# Patient Record
Sex: FEMALE | Race: WHITE | NOT HISPANIC OR LATINO
[De-identification: names, ages, dates, MRNs, and addresses within clinical notes are randomized per-mention and may not be internally consistent; named-entity substitution may affect disease eponyms.]

---

## 2020-08-03 ENCOUNTER — RESULT REVIEW (OUTPATIENT)
Age: 35
End: 2020-08-03

## 2021-11-05 PROBLEM — Z00.00 ENCOUNTER FOR PREVENTIVE HEALTH EXAMINATION: Status: ACTIVE | Noted: 2021-11-05

## 2021-11-08 ENCOUNTER — APPOINTMENT (OUTPATIENT)
Dept: OBGYN | Facility: CLINIC | Age: 36
End: 2021-11-08
Payer: COMMERCIAL

## 2021-11-08 ENCOUNTER — APPOINTMENT (OUTPATIENT)
Dept: ANTEPARTUM | Facility: CLINIC | Age: 36
End: 2021-11-08
Payer: COMMERCIAL

## 2021-11-08 DIAGNOSIS — Z83.3 FAMILY HISTORY OF DIABETES MELLITUS: ICD-10-CM

## 2021-11-08 DIAGNOSIS — Z82.49 FAMILY HISTORY OF ISCHEMIC HEART DISEASE AND OTHER DISEASES OF THE CIRCULATORY SYSTEM: ICD-10-CM

## 2021-11-08 DIAGNOSIS — Z3A.13 13 WEEKS GESTATION OF PREGNANCY: ICD-10-CM

## 2021-11-08 DIAGNOSIS — Z36.82 ENCOUNTER FOR ANTENATAL SCREENING FOR NUCHAL TRANSLUCENCY: ICD-10-CM

## 2021-11-08 PROCEDURE — 76801 OB US < 14 WKS SINGLE FETUS: CPT

## 2021-11-08 PROCEDURE — 99242 OFF/OP CONSLTJ NEW/EST SF 20: CPT

## 2021-11-08 PROCEDURE — 99202 OFFICE O/P NEW SF 15 MIN: CPT

## 2021-11-08 PROCEDURE — 0502F SUBSEQUENT PRENATAL CARE: CPT

## 2021-11-08 PROCEDURE — 76813 OB US NUCHAL MEAS 1 GEST: CPT

## 2021-11-08 RX ORDER — ASPIRIN 81 MG
81 TABLET, DELAYED RELEASE (ENTERIC COATED) ORAL
Refills: 0 | Status: ACTIVE | COMMUNITY

## 2021-11-08 NOTE — ACTIVE PROBLEMS
[Diabetes Mellitus] : no diabetes mellitus [Hypertension] : no hypertension [Heart Disease] : no heart disease [Autoimmune Disease] : no autoimmune disease [Renal Disease] : no kidney disease, no UTI [Psychiatric Disorders] : no psychiatric disorders [Neurologic Disorder] : no neurologic disorder, no epilepsy [Depression] : no depression, no post partum depression [Hepatic Disorder] : no hepatitis, no liver disease [Thrombophlebitis] : no varicosities, no phlebitis [Thyroid Disorder] : no thyroid dysfunction [Trauma] : no trauma/violence [Blood Transfusion (___ Ml)] : no history of blood transfusion

## 2022-01-04 LAB
1ST TRIMESTER DATA: NORMAL
ADDENDUM DOC: NORMAL
AFP PNL SERPL: NORMAL
AFP SERPL-ACNC: NORMAL
CLINICAL BIOCHEMIST REVIEW: NORMAL
FREE BETA HCG 1ST TRIMESTER: NORMAL
Lab: NORMAL
NOTES NTD: NORMAL
NT: NORMAL
PAPP-A SERPL-ACNC: NORMAL
TRISOMY 18/3: NORMAL

## 2022-01-06 ENCOUNTER — APPOINTMENT (OUTPATIENT)
Dept: ANTEPARTUM | Facility: CLINIC | Age: 37
End: 2022-01-06
Payer: COMMERCIAL

## 2022-01-06 VITALS — WEIGHT: 158 LBS | SYSTOLIC BLOOD PRESSURE: 124 MMHG | DIASTOLIC BLOOD PRESSURE: 76 MMHG

## 2022-01-06 PROCEDURE — 76811 OB US DETAILED SNGL FETUS: CPT

## 2022-02-08 NOTE — OB HISTORY
[Pregnancy History] : patient received anesthesia [[facility]] : [facility] [LMP: ___] : LMP: [unfilled] [FELIX: ___] : FLEIX: [unfilled] [EGA: ___ wks] : EGA: [unfilled] wks [Spontaneous] : Spontaneous conception [___] : no pregnancy complications reported

## 2022-02-08 NOTE — DISCUSSION/SUMMARY
[FreeTextEntry1] : The significance of nuchal translucency testing was explained to the patient and ultrasound was performed. The sensitivity of the test can be improved by combining with second trimester quad screening. This type of sequential testing minimally increases the false positive rate, but the detection rate for Down syndrome is increased. If the sequential testing is desired, a second stage quad should be drawn and sent. As an alternative, this can be done in our office. If the patient does not desire sequential testing, then a single marker for AFP may be sent to any lab after 15 completed weeks gestation.\par \par Prenatal diagnostic testing is clinically indicated for this patient. The limitations of NIPT testing were discussed with the patient and amniocentesis was noted to remain the gold standard for prenatal diagnostic testing. The significance of NIPT testing was reviewed and offered to the patient and she has agreed to testing. Blood was drawn and the results will be sent to your office in approximately 2 weeks. Sequential screening is recommended between 15-16 weeks. Anatomy scan is recommended at 19-20 weeks.\par \par =============================================\par I, Felecia Brooke, acted solely as a scribe for Dr. Abhi Ta on Nov 8 2021  6:00PM. All medical entries made by the Scribe were at my, Dr. Abhi Ta's, direction and personally dictated by me on Nov 8 2021  6:00PM . I have reviewed the chart and agree that the record accurately reflects my personal performance of the history, physical exam, assessment, and plan. I have also personally directed, reviewed, and agreed with the chart.\par =============================================

## 2022-02-08 NOTE — HISTORY OF PRESENT ILLNESS
[FreeTextEntry1] : The patient was seen in my office today for nuchal translucency testing. The limitations of testing were discussed with the patient and she was informed that this is a screening test. If she desires a diagnostic test like CVS or Amniocentesis, this may be performed.\par \par Patient is a 36 year old P1 presenting at 13w6d for  counseling because of advanced maternal age. FELIX 2022. Her age related risk for chromosomal abnormalities is 1:80. The patient reports an unremarkable maternal and paternal family history and her pregnancy history is also noted to be unremarkable.

## 2022-03-21 ENCOUNTER — APPOINTMENT (OUTPATIENT)
Dept: ANTEPARTUM | Facility: CLINIC | Age: 37
End: 2022-03-21
Payer: COMMERCIAL

## 2022-03-21 ENCOUNTER — APPOINTMENT (OUTPATIENT)
Dept: OBGYN | Facility: CLINIC | Age: 37
End: 2022-03-21
Payer: COMMERCIAL

## 2022-03-21 VITALS — SYSTOLIC BLOOD PRESSURE: 124 MMHG | WEIGHT: 169 LBS | DIASTOLIC BLOOD PRESSURE: 77 MMHG

## 2022-03-21 PROCEDURE — 76819 FETAL BIOPHYS PROFIL W/O NST: CPT

## 2022-03-21 PROCEDURE — 76816 OB US FOLLOW-UP PER FETUS: CPT

## 2022-03-21 PROCEDURE — 0502F SUBSEQUENT PRENATAL CARE: CPT

## 2022-04-28 ENCOUNTER — APPOINTMENT (OUTPATIENT)
Dept: ANTEPARTUM | Facility: CLINIC | Age: 37
End: 2022-04-28
Payer: COMMERCIAL

## 2022-04-28 ENCOUNTER — APPOINTMENT (OUTPATIENT)
Dept: OBGYN | Facility: CLINIC | Age: 37
End: 2022-04-28
Payer: COMMERCIAL

## 2022-04-28 VITALS — WEIGHT: 170 LBS | SYSTOLIC BLOOD PRESSURE: 111 MMHG | DIASTOLIC BLOOD PRESSURE: 77 MMHG

## 2022-04-28 PROCEDURE — 76819 FETAL BIOPHYS PROFIL W/O NST: CPT

## 2022-04-28 PROCEDURE — 76816 OB US FOLLOW-UP PER FETUS: CPT

## 2022-04-28 PROCEDURE — 0502F SUBSEQUENT PRENATAL CARE: CPT

## 2022-05-04 ENCOUNTER — APPOINTMENT (OUTPATIENT)
Dept: ANTEPARTUM | Facility: CLINIC | Age: 37
End: 2022-05-04
Payer: COMMERCIAL

## 2022-05-04 ENCOUNTER — APPOINTMENT (OUTPATIENT)
Dept: OBGYN | Facility: CLINIC | Age: 37
End: 2022-05-04

## 2022-05-04 VITALS — WEIGHT: 171 LBS | SYSTOLIC BLOOD PRESSURE: 128 MMHG | DIASTOLIC BLOOD PRESSURE: 76 MMHG

## 2022-05-04 PROCEDURE — 76819 FETAL BIOPHYS PROFIL W/O NST: CPT

## 2022-05-09 ENCOUNTER — INPATIENT (INPATIENT)
Facility: HOSPITAL | Age: 37
LOS: 1 days | Discharge: HOME | End: 2022-05-11
Attending: SPECIALIST | Admitting: SPECIALIST

## 2022-05-09 ENCOUNTER — TRANSCRIPTION ENCOUNTER (OUTPATIENT)
Age: 37
End: 2022-05-09

## 2022-05-09 VITALS
RESPIRATION RATE: 14 BRPM | WEIGHT: 171.96 LBS | HEIGHT: 66 IN | DIASTOLIC BLOOD PRESSURE: 67 MMHG | TEMPERATURE: 98 F | HEART RATE: 88 BPM | SYSTOLIC BLOOD PRESSURE: 105 MMHG

## 2022-05-09 DIAGNOSIS — Z34.90 ENCOUNTER FOR SUPERVISION OF NORMAL PREGNANCY, UNSPECIFIED, UNSPECIFIED TRIMESTER: ICD-10-CM

## 2022-05-09 LAB
BASOPHILS # BLD AUTO: 0.01 K/UL — SIGNIFICANT CHANGE UP (ref 0–0.2)
BASOPHILS NFR BLD AUTO: 0.1 % — SIGNIFICANT CHANGE UP (ref 0–2)
BLD GP AB SCN SERPL QL: NEGATIVE — SIGNIFICANT CHANGE UP
COVID-19 SPIKE DOMAIN AB INTERP: POSITIVE
COVID-19 SPIKE DOMAIN ANTIBODY RESULT: 102 U/ML — HIGH
EOSINOPHIL # BLD AUTO: 0.07 K/UL — SIGNIFICANT CHANGE UP (ref 0–0.5)
EOSINOPHIL NFR BLD AUTO: 0.9 % — SIGNIFICANT CHANGE UP (ref 0–6)
HCT VFR BLD CALC: 35.6 % — SIGNIFICANT CHANGE UP (ref 34.5–45)
HGB BLD-MCNC: 11.6 G/DL — SIGNIFICANT CHANGE UP (ref 11.5–15.5)
IANC: 5.14 K/UL — SIGNIFICANT CHANGE UP (ref 1.8–7.4)
IMM GRANULOCYTES NFR BLD AUTO: 0.3 % — SIGNIFICANT CHANGE UP (ref 0–1.5)
LYMPHOCYTES # BLD AUTO: 1.62 K/UL — SIGNIFICANT CHANGE UP (ref 1–3.3)
LYMPHOCYTES # BLD AUTO: 21.7 % — SIGNIFICANT CHANGE UP (ref 13–44)
MCHC RBC-ENTMCNC: 28.9 PG — SIGNIFICANT CHANGE UP (ref 27–34)
MCHC RBC-ENTMCNC: 32.6 GM/DL — SIGNIFICANT CHANGE UP (ref 32–36)
MCV RBC AUTO: 88.8 FL — SIGNIFICANT CHANGE UP (ref 80–100)
MONOCYTES # BLD AUTO: 0.6 K/UL — SIGNIFICANT CHANGE UP (ref 0–0.9)
MONOCYTES NFR BLD AUTO: 8 % — SIGNIFICANT CHANGE UP (ref 2–14)
NEUTROPHILS # BLD AUTO: 5.14 K/UL — SIGNIFICANT CHANGE UP (ref 1.8–7.4)
NEUTROPHILS NFR BLD AUTO: 69 % — SIGNIFICANT CHANGE UP (ref 43–77)
NRBC # BLD: 0 /100 WBCS — SIGNIFICANT CHANGE UP
NRBC # FLD: 0 K/UL — SIGNIFICANT CHANGE UP
PLATELET # BLD AUTO: 187 K/UL — SIGNIFICANT CHANGE UP (ref 150–400)
RBC # BLD: 4.01 M/UL — SIGNIFICANT CHANGE UP (ref 3.8–5.2)
RBC # FLD: 12.8 % — SIGNIFICANT CHANGE UP (ref 10.3–14.5)
RH IG SCN BLD-IMP: POSITIVE — SIGNIFICANT CHANGE UP
SARS-COV-2 IGG+IGM SERPL QL IA: 102 U/ML — HIGH
SARS-COV-2 IGG+IGM SERPL QL IA: POSITIVE
T PALLIDUM AB TITR SER: NEGATIVE — SIGNIFICANT CHANGE UP
WBC # BLD: 7.46 K/UL — SIGNIFICANT CHANGE UP (ref 3.8–10.5)
WBC # FLD AUTO: 7.46 K/UL — SIGNIFICANT CHANGE UP (ref 3.8–10.5)

## 2022-05-09 RX ORDER — SODIUM CHLORIDE 9 MG/ML
1000 INJECTION, SOLUTION INTRAVENOUS
Refills: 0 | Status: DISCONTINUED | OUTPATIENT
Start: 2022-05-09 | End: 2022-05-10

## 2022-05-09 RX ORDER — AMPICILLIN TRIHYDRATE 250 MG
1 CAPSULE ORAL EVERY 4 HOURS
Refills: 0 | Status: DISCONTINUED | OUTPATIENT
Start: 2022-05-09 | End: 2022-05-10

## 2022-05-09 RX ORDER — OXYTOCIN 10 UNIT/ML
2 VIAL (ML) INJECTION
Qty: 30 | Refills: 0 | Status: DISCONTINUED | OUTPATIENT
Start: 2022-05-09 | End: 2022-05-10

## 2022-05-09 RX ORDER — AMPICILLIN TRIHYDRATE 250 MG
2 CAPSULE ORAL ONCE
Refills: 0 | Status: COMPLETED | OUTPATIENT
Start: 2022-05-09 | End: 2022-05-09

## 2022-05-09 RX ORDER — SODIUM CHLORIDE 9 MG/ML
1000 INJECTION INTRAMUSCULAR; INTRAVENOUS; SUBCUTANEOUS
Refills: 0 | Status: DISCONTINUED | OUTPATIENT
Start: 2022-05-09 | End: 2022-05-10

## 2022-05-09 RX ORDER — OXYTOCIN 10 UNIT/ML
333.33 VIAL (ML) INJECTION
Qty: 20 | Refills: 0 | Status: DISCONTINUED | OUTPATIENT
Start: 2022-05-09 | End: 2022-05-10

## 2022-05-09 RX ORDER — SODIUM CHLORIDE 9 MG/ML
300 INJECTION INTRAMUSCULAR; INTRAVENOUS; SUBCUTANEOUS ONCE
Refills: 0 | Status: COMPLETED | OUTPATIENT
Start: 2022-05-09 | End: 2022-05-09

## 2022-05-09 RX ORDER — CITRIC ACID/SODIUM CITRATE 300-500 MG
15 SOLUTION, ORAL ORAL EVERY 6 HOURS
Refills: 0 | Status: DISCONTINUED | OUTPATIENT
Start: 2022-05-09 | End: 2022-05-10

## 2022-05-09 RX ADMIN — SODIUM CHLORIDE 125 MILLILITER(S): 9 INJECTION INTRAMUSCULAR; INTRAVENOUS; SUBCUTANEOUS at 22:13

## 2022-05-09 RX ADMIN — Medication 108 GRAM(S): at 15:36

## 2022-05-09 RX ADMIN — Medication 108 GRAM(S): at 10:55

## 2022-05-09 RX ADMIN — Medication 216 GRAM(S): at 06:43

## 2022-05-09 RX ADMIN — SODIUM CHLORIDE 125 MILLILITER(S): 9 INJECTION, SOLUTION INTRAVENOUS at 06:43

## 2022-05-09 RX ADMIN — Medication 108 GRAM(S): at 23:45

## 2022-05-09 RX ADMIN — Medication 108 GRAM(S): at 19:40

## 2022-05-09 RX ADMIN — SODIUM CHLORIDE 600 MILLILITER(S): 9 INJECTION INTRAMUSCULAR; INTRAVENOUS; SUBCUTANEOUS at 20:55

## 2022-05-09 RX ADMIN — Medication 2 MILLIUNIT(S)/MIN: at 17:16

## 2022-05-09 NOTE — OB PROVIDER LABOR PROGRESS NOTE - ASSESSMENT
35 y/o  at 39/4 admitted for rrIOL  - pt unable to tolerate placement of cervical balloon  - will reattempt later  - cont EFM/toco 35 y/o  at 39/4 admitted for rrIOL  - pt unable to tolerate placement of cervical balloon  - will reattempt later  - cont EFM/toco  - d/w DR Palacios

## 2022-05-09 NOTE — OB PROVIDER LABOR PROGRESS NOTE - NS_SUBJECTIVE/OBJECTIVE_OBGYN_ALL_OB_FT
Patient examined for cervical change
VE due to pt feeling increased rectal pressure with contractions
pt seen and examined at bedside for placement of cervical balloon
VE due to variable declerations
VE due to pt feeling rectal pressure
pt seen and examined at bedside
Patient comfortable at bedside, does not tolerate exams well but currently declines an epidural

## 2022-05-09 NOTE — OB PROVIDER LABOR PROGRESS NOTE - NS_OBIHIFHRDETAILS_OBGYN_ALL_OB_FT
130s, moderate variability, accels, intermittent variables
120s, moderate variability, accels, intermittent variable decelerations
120s, moderate variability, accels, no decels
130/mod variability/ +accels/-decels
120 mod jackie +accel -decel
130/mod variability/+accels/-decels
Cat 1: 120/mod variability/ - accels/ - decels

## 2022-05-09 NOTE — OB PROVIDER LABOR PROGRESS NOTE - ASSESSMENT
37 y/o  at 39/4 weeks admitted for rrIOL  - continue with PO cytotec x1, then plan for pitocin   - continue EFM/toco  - epi PRN  - d/w  Dr Palacios

## 2022-05-09 NOTE — OB PROVIDER LABOR PROGRESS NOTE - ASSESSMENT
Risk reducing induction of labor  - Continue pitocin  - Top off    HANNA Welch PGY4  d/w Dr. Palacios

## 2022-05-09 NOTE — OB PROVIDER LABOR PROGRESS NOTE - ASSESSMENT
35yo  @39+4 here for rrIOL    - SVE 2.5/50/-3  - c/w po cytotec  - FHT Cat 1, reassuring, ctm    d/w Dr. Suzanne Mix, PGY1

## 2022-05-09 NOTE — OB PROVIDER H&P - NSPRENATALGBS_OBGYN_ALL_OB_GET_DAYS
Thank you for choosing the [facility] [facility], Griffin Nguyen MD, and our team as your Neurology Specialists.  We actively use feedback to constantly improve and deliver the best care possible. To provide the best experience, we are collecting feedback from you on how we performed.  You may receive a survey in the mail to evaluate how we did. Please take a moment and share your thoughts.      If for any reason you feel that we did not meet your expectations or you want to share a positive experience, please give us a call. Your feedback helps us know how we are doing and what we can be doing better.    Office hours: 8:00 am to 4:30 pm, Monday - Friday  Phone: [phone]    
28

## 2022-05-09 NOTE — OB PROVIDER LABOR PROGRESS NOTE - NSVAGINALEXAM_OBGYN_ALL_OB_DT
09-May-2022 14:19
09-May-2022 09:38
09-May-2022 11:32
09-May-2022 23:45
09-May-2022 21:07
09-May-2022 22:42
09-May-2022 23:23

## 2022-05-09 NOTE — OB PROVIDER LABOR PROGRESS NOTE - ASSESSMENT
- c/w IUPC and amnioinfusion   - c/w pitocin   - anticipate     Padma Yang, PGY1  Safety Officers aware

## 2022-05-09 NOTE — OB PROVIDER LABOR PROGRESS NOTE - ASSESSMENT
- c/w pitocin   - IUPC placed without difficulty   - start amnioinfusion    Padma Yang, PGY1  d/w Dr. Palacios

## 2022-05-09 NOTE — OB PROVIDER H&P - HISTORY OF PRESENT ILLNESS
HPI: Pt is a 35 yo  at 39/4  presenting for rrIOL.  FM +  LOF denies  CTX denies  VB denies    Prenatal Issues: none  GBS +  EFW 3857    OBHx:  2014//M/6#9 uncomplicated     Gyn Hx:  HPV +, normal colposcopy  denies fibroids, ovarian cysts, PID, STDs,     PMH: denies     SHx:  -  breast reduction   -  left bunion removal     Psych:   - h/o anxiety. no meds    Social:  - denies     Medications: PNV, ASA, iron     Allergies: none     Will Accept blood transfusion? yes    Vital Signs Last 24 Hrs    HR: 88 (09 May 2022 06:55) (88 - 88)  BP: 105/67 (09 May 2022 06:55) (105/67 - 105/67)    Physical Exam:  Gen: NAD, AOx3  heart: RRR  lungs: clear b/l  Abd: soft, NT, ND, gravid      SVE: 0.5/50/-3  FHT:  [place]:  EFW: 3857  Sono: vertex      A/P: Pt is a 35 yo  who presents for rrIOL  - GBS +    1. Admit to LND. Routine Labs. IVF  2. plan for PO cytotec and cervical balloon  3. Fetus: Cat X tracing, Vertex, EFW _ . C/w EFM.  4. ampicillin  GBS +,  5. Pain: IV pain meds/epidural PRN    d/w DR Downing        HPI: Pt is a 37 yo  at 39/4  presenting for rrIOL.  FM +  LOF denies  CTX denies  VB denies    Prenatal Issues: none  GBS +  EFW 3857    OBHx:  2014//M/6#9 uncomplicated     Gyn Hx:  HPV +, normal colposcopy  denies fibroids, ovarian cysts, PID, STDs,     PMH: denies     SHx:  -  breast reduction   -  left bunion removal     Psych:   - h/o anxiety. no meds    Social:  - denies     Medications: PNV, ASA, iron     Allergies: none     Will Accept blood transfusion? yes    Vital Signs Last 24 Hrs    HR: 88 (09 May 2022 06:55) (88 - 88)  BP: 105/67 (09 May 2022 06:55) (105/67 - 105/67)    Physical Exam:  Gen: NAD, AOx3  heart: RRR  lungs: clear b/l  Abd: soft, NT, ND, gravid      SVE: 0.5/50/-3  FHT: 130/mod variability/+accels/- decels  [place]: none seen   EFW: 3857  Sono: vertex      A/P: Pt is a 37 yo  who presents for rrIOL  - GBS +    1. Admit to LND. Routine Labs. IVF  2. plan for PO cytotec and cervical balloon  3. Fetus: Cat 1 tracing, Vertex, EFW 3857 .   4. ampicillin  GBS +,  5. Pain: IV pain meds/epidural PRN    d/w DR Downing

## 2022-05-10 RX ORDER — IBUPROFEN 200 MG
600 TABLET ORAL EVERY 6 HOURS
Refills: 0 | Status: DISCONTINUED | OUTPATIENT
Start: 2022-05-10 | End: 2022-05-11

## 2022-05-10 RX ORDER — OXYCODONE HYDROCHLORIDE 5 MG/1
5 TABLET ORAL ONCE
Refills: 0 | Status: DISCONTINUED | OUTPATIENT
Start: 2022-05-10 | End: 2022-05-11

## 2022-05-10 RX ORDER — KETOROLAC TROMETHAMINE 30 MG/ML
30 SYRINGE (ML) INJECTION ONCE
Refills: 0 | Status: DISCONTINUED | OUTPATIENT
Start: 2022-05-10 | End: 2022-05-11

## 2022-05-10 RX ORDER — ACETAMINOPHEN 500 MG
3 TABLET ORAL
Qty: 0 | Refills: 0 | DISCHARGE
Start: 2022-05-10

## 2022-05-10 RX ORDER — ACETAMINOPHEN 500 MG
975 TABLET ORAL
Refills: 0 | Status: DISCONTINUED | OUTPATIENT
Start: 2022-05-10 | End: 2022-05-11

## 2022-05-10 RX ORDER — SIMETHICONE 80 MG/1
80 TABLET, CHEWABLE ORAL EVERY 4 HOURS
Refills: 0 | Status: DISCONTINUED | OUTPATIENT
Start: 2022-05-10 | End: 2022-05-11

## 2022-05-10 RX ORDER — MAGNESIUM HYDROXIDE 400 MG/1
30 TABLET, CHEWABLE ORAL
Refills: 0 | Status: DISCONTINUED | OUTPATIENT
Start: 2022-05-10 | End: 2022-05-11

## 2022-05-10 RX ORDER — IBUPROFEN 200 MG
600 TABLET ORAL EVERY 6 HOURS
Refills: 0 | Status: COMPLETED | OUTPATIENT
Start: 2022-05-10 | End: 2023-04-08

## 2022-05-10 RX ORDER — PRAMOXINE HYDROCHLORIDE 150 MG/15G
1 AEROSOL, FOAM RECTAL EVERY 4 HOURS
Refills: 0 | Status: DISCONTINUED | OUTPATIENT
Start: 2022-05-10 | End: 2022-05-11

## 2022-05-10 RX ORDER — OXYCODONE HYDROCHLORIDE 5 MG/1
5 TABLET ORAL
Refills: 0 | Status: DISCONTINUED | OUTPATIENT
Start: 2022-05-10 | End: 2022-05-11

## 2022-05-10 RX ORDER — OXYTOCIN 10 UNIT/ML
333.33 VIAL (ML) INJECTION
Qty: 20 | Refills: 0 | Status: DISCONTINUED | OUTPATIENT
Start: 2022-05-10 | End: 2022-05-11

## 2022-05-10 RX ORDER — LANOLIN
1 OINTMENT (GRAM) TOPICAL EVERY 6 HOURS
Refills: 0 | Status: DISCONTINUED | OUTPATIENT
Start: 2022-05-10 | End: 2022-05-11

## 2022-05-10 RX ORDER — TETANUS TOXOID, REDUCED DIPHTHERIA TOXOID AND ACELLULAR PERTUSSIS VACCINE, ADSORBED 5; 2.5; 8; 8; 2.5 [IU]/.5ML; [IU]/.5ML; UG/.5ML; UG/.5ML; UG/.5ML
0.5 SUSPENSION INTRAMUSCULAR ONCE
Refills: 0 | Status: DISCONTINUED | OUTPATIENT
Start: 2022-05-10 | End: 2022-05-11

## 2022-05-10 RX ORDER — DIPHENHYDRAMINE HCL 50 MG
25 CAPSULE ORAL EVERY 6 HOURS
Refills: 0 | Status: DISCONTINUED | OUTPATIENT
Start: 2022-05-10 | End: 2022-05-11

## 2022-05-10 RX ORDER — HYDROCORTISONE 1 %
1 OINTMENT (GRAM) TOPICAL EVERY 6 HOURS
Refills: 0 | Status: DISCONTINUED | OUTPATIENT
Start: 2022-05-10 | End: 2022-05-11

## 2022-05-10 RX ORDER — IBUPROFEN 200 MG
1 TABLET ORAL
Qty: 0 | Refills: 0 | DISCHARGE
Start: 2022-05-10

## 2022-05-10 RX ORDER — AER TRAVELER 0.5 G/1
1 SOLUTION RECTAL; TOPICAL EVERY 4 HOURS
Refills: 0 | Status: DISCONTINUED | OUTPATIENT
Start: 2022-05-10 | End: 2022-05-11

## 2022-05-10 RX ORDER — BENZOCAINE 10 %
1 GEL (GRAM) MUCOUS MEMBRANE EVERY 6 HOURS
Refills: 0 | Status: DISCONTINUED | OUTPATIENT
Start: 2022-05-10 | End: 2022-05-11

## 2022-05-10 RX ORDER — DIBUCAINE 1 %
1 OINTMENT (GRAM) RECTAL EVERY 6 HOURS
Refills: 0 | Status: DISCONTINUED | OUTPATIENT
Start: 2022-05-10 | End: 2022-05-11

## 2022-05-10 RX ORDER — SODIUM CHLORIDE 9 MG/ML
3 INJECTION INTRAMUSCULAR; INTRAVENOUS; SUBCUTANEOUS EVERY 8 HOURS
Refills: 0 | Status: DISCONTINUED | OUTPATIENT
Start: 2022-05-10 | End: 2022-05-11

## 2022-05-10 RX ADMIN — Medication 600 MILLIGRAM(S): at 17:06

## 2022-05-10 RX ADMIN — Medication 600 MILLIGRAM(S): at 09:58

## 2022-05-10 RX ADMIN — Medication 975 MILLIGRAM(S): at 06:42

## 2022-05-10 RX ADMIN — Medication 975 MILLIGRAM(S): at 13:57

## 2022-05-10 RX ADMIN — Medication 975 MILLIGRAM(S): at 21:16

## 2022-05-10 RX ADMIN — SODIUM CHLORIDE 3 MILLILITER(S): 9 INJECTION INTRAMUSCULAR; INTRAVENOUS; SUBCUTANEOUS at 21:16

## 2022-05-10 RX ADMIN — Medication 0.2 MILLIGRAM(S): at 00:54

## 2022-05-10 RX ADMIN — Medication 975 MILLIGRAM(S): at 21:46

## 2022-05-10 RX ADMIN — Medication 975 MILLIGRAM(S): at 06:02

## 2022-05-10 RX ADMIN — Medication 600 MILLIGRAM(S): at 10:28

## 2022-05-10 RX ADMIN — Medication 600 MILLIGRAM(S): at 17:36

## 2022-05-10 RX ADMIN — Medication 975 MILLIGRAM(S): at 14:27

## 2022-05-10 RX ADMIN — SODIUM CHLORIDE 3 MILLILITER(S): 9 INJECTION INTRAMUSCULAR; INTRAVENOUS; SUBCUTANEOUS at 14:15

## 2022-05-10 NOTE — CHART NOTE - NSCHARTNOTEFT_GEN_A_CORE
ob attending     moderate variability accels no decels  toco irreg  a/p cat 1 fht  continue RR IOL    Tonja SPEARS
ob attending    pt comfortable  ve 3/long  fht 120 accels no decels moderate variability  toco q 2-3 min  a/p cat 1 fht  arom clear  continue timocin    Tonja SPEARS
Called to bedside to evaluate patient for numbness on left thight. No calf pain. On physical exam no calf pain, no redness, no erythema. Patinet counseled that its likely a reaction to epidural.    -likely reaction to epidural  -contine to monitor      to be discussed w dr steven tabares pgy-1

## 2022-05-10 NOTE — DISCHARGE NOTE OB - PATIENT PORTAL LINK FT
You can access the FollowMyHealth Patient Portal offered by [facility] by registering at the following website: [email or web address]. By joining [facility]’s FollowMyHealth portal, you will also be able to view your health information using other applications (apps) compatible with our system.

## 2022-05-10 NOTE — OB NEONATOLOGY/PEDIATRICIAN DELIVERY SUMMARY - NSPEDSNEONOTESA_OBGYN_ALL_OB_FT
Baby is a 39.5 wk GA male born to a 37 y/o  mother via . Peds called to the delivery for concern for a shoulder dystocia, which did not occur. Maternal history of self diagnosed anxiety. Prenatal history uncomplicated. Maternal BT B+. PNL neg, NR, and immune. GBS positive on , ampicillin x6. AROM at 18:27 on , clear fluids. No maternal fever. Baby born vigorous and crying spontaneously. WDSS. Apgars 99. EOS 0.07. Mom plans to breast and bottle feed, would like hepB vaccination and circumcision. COVID status negative. Transferred to the nursery for routine  care.    BW: 3450g  TOB: 00:44  : 5/10/22

## 2022-05-10 NOTE — OB PROVIDER DELIVERY SUMMARY - NSSELHIDDEN_OBGYN_ALL_OB_FT
[NS_DeliveryAttending1_OBGYN_ALL_OB_FT:[id number]],[NS_DeliveryAssist1_OBGYN_ALL_OB_FT:[id number]=],[NS_DeliveryRN_OBGYN_ALL_OB_FT:[id number]==]

## 2022-05-10 NOTE — PROVIDER CONTACT NOTE (OTHER) - BACKGROUND
NSD from 5/10/2022 @ 0044.  Parity 2002.  QBL 185mL.  39.4 weeks gestation.  B+ bloodtype.  Hx. o anxiety, social work to see pt.

## 2022-05-10 NOTE — PROVIDER CONTACT NOTE (OTHER) - ACTION/TREATMENT ORDERED:
MD Nuzhat mendosa.  MD to come assess pt.  Will continue to monitor. MD Nuzhat Blum aware.  MD to come assess pt.  Will continue to monitor.

## 2022-05-10 NOTE — DISCHARGE NOTE OB - MATERIALS PROVIDED
[facility] Atlas Screening Program/Atlas  Immunization Record/Breastfeeding Log/Bottle Feeding Log/Breastfeeding Mother’s Support Group Information/Guide to Postpartum Care/[facility] Hearing Screen Program/Back To Sleep Handout/Shaken Baby Prevention Handout/Breastfeeding Guide and Packet/Birth Certificate Instructions/Discharge Medication Information for Patients and Families Pocket Guide

## 2022-05-10 NOTE — OB RN DELIVERY SUMMARY - NS_SEPSISRSKCALC_OBGYN_ALL_OB_FT
EOS calculated successfully. EOS Risk Factor: 0.5/1000 live births ([facility] national incidence); GA=39w5d; Temp=98.6; ROM=6.283; GBS='Positive'; Antibiotics='Broad spectrum antibiotics > 4 hrs prior to birth'

## 2022-05-10 NOTE — DISCHARGE NOTE OB - MEDICATION SUMMARY - MEDICATIONS TO TAKE
I will START or STAY ON the medications listed below when I get home from the hospital:    acetaminophen 325 mg oral tablet  -- 3 tab(s) by mouth every 6 hours  -- Indication: For     ibuprofen 600 mg oral tablet  -- 1 tab(s) by mouth every 6 hours  -- Indication: For     Prenatal Multivitamins with Folic Acid 1 mg oral tablet  -- 1 tab(s) by mouth once a day  -- Indication: For    I will START or STAY ON the medications listed below when I get home from the hospital:    ibuprofen 600 mg oral tablet  -- 1 tab(s) by mouth every 6 hours, As Needed  -- Indication: For  (normal spontaneous vaginal delivery)    acetaminophen 325 mg oral tablet  -- 3 tab(s) by mouth every 6 hours, As Needed  -- Indication: For  (normal spontaneous vaginal delivery)    Prenatal Multivitamins with Folic Acid 1 mg oral tablet  -- 1 tab(s) by mouth once a day  -- Indication: For

## 2022-05-10 NOTE — DISCHARGE NOTE OB - NS MD DC FALL RISK RISK
For information on Fall & Injury Prevention, visit: [email or web address] OR  [email or web address] OR  https://www.cdc.gov/steadi/patient.html

## 2022-05-10 NOTE — OB PROVIDER DELIVERY SUMMARY - NSPROVIDERDELIVERYNOTE_OBGYN_ALL_OB_FT
Spontaneous vaginal delivery of liveborn infant from RANULFO position. Head, shoulders, and body delivered easily. Nuchal x1 delivered through. Infant was suctioned. No mec. Cord was clamped and cut and infant was passed to mother. Placenta delivered intact with a 3 vessel cord. Fundal and bimanual massage was given and uterine fundus was found to be firm then atonic. Cytotec and Methergine given with improvement in tone. Vaginal exam revealed an intact cervix, vaginal walls, sulci and perineum. Patient had small right periurethral laceration that was hemostatic. Excellent hemostasis was noted. Count was correct x 2. Spontaneous vaginal delivery of liveborn infant from RANULFO position. Head, shoulders, and body delivered easily. Nuchal x1 delivered through. Infant was suctioned. No mec. Cord was clamped and cut and infant was passed to mother. Placenta delivered intact with a 3 vessel cord. Fundal and bimanual massage was given and uterine fundus was found to be firm then atonic. Cytotec 1000mcg x 1 LA and Methergine 0.2 mg IM given with improvement in tone. Vaginal exam revealed an intact cervix, vaginal walls, sulci and perineum. Patient had small right periurethral laceration that was hemostatic. Excellent hemostasis was noted. Count was correct x 2.    Ob attending    agree with above  no complications of  liveborn male infant    Tonja SPEARS

## 2022-05-10 NOTE — DISCHARGE NOTE OB - CARE PLAN
1 Principal Discharge DX:	 (normal spontaneous vaginal delivery)  Assessment and plan of treatment:	regular

## 2022-05-10 NOTE — OB RN DELIVERY SUMMARY - NS_NEWBORNAMRN_OBGYN_ALL_OB_FT
Apixaban/Eliquis - Compliance/Apixaban/Eliquis - Dietary Advice/Apixaban/Eliquis - Follow up monitoring/Apixaban/Eliquis - Potential for adverse drug reactions and interactions
[id number]

## 2022-05-10 NOTE — OB RN DELIVERY SUMMARY - NSSELHIDDEN_OBGYN_ALL_OB_FT
[NS_DeliveryAttending1_OBGYN_ALL_OB_FT:[id number]],[NS_DeliveryAssist1_OBGYN_ALL_OB_FT:[id number]],[NS_DeliveryRN_OBGYN_ALL_OB_FT:[id number]]

## 2022-05-10 NOTE — DISCHARGE NOTE OB - CARE PROVIDER_API CALL
Cyril Downing)  Obstetrics and Gynecology  [address] [place] [address]  Phone: [phone]  Fax: [phone]  Follow Up Time:

## 2022-05-11 ENCOUNTER — APPOINTMENT (OUTPATIENT)
Dept: ANTEPARTUM | Facility: CLINIC | Age: 37
End: 2022-05-11

## 2022-05-11 ENCOUNTER — APPOINTMENT (OUTPATIENT)
Dept: OBGYN | Facility: CLINIC | Age: 37
End: 2022-05-11

## 2022-05-11 VITALS
RESPIRATION RATE: 16 BRPM | HEART RATE: 76 BPM | OXYGEN SATURATION: 99 % | DIASTOLIC BLOOD PRESSURE: 82 MMHG | TEMPERATURE: 98 F | SYSTOLIC BLOOD PRESSURE: 118 MMHG

## 2022-05-11 RX ADMIN — Medication 600 MILLIGRAM(S): at 06:28

## 2022-05-11 RX ADMIN — Medication 1 TABLET(S): at 11:57

## 2022-05-11 RX ADMIN — Medication 975 MILLIGRAM(S): at 08:46

## 2022-05-11 RX ADMIN — MAGNESIUM HYDROXIDE 30 MILLILITER(S): 400 TABLET, CHEWABLE ORAL at 08:47

## 2022-05-11 RX ADMIN — Medication 975 MILLIGRAM(S): at 02:14

## 2022-05-11 RX ADMIN — Medication 600 MILLIGRAM(S): at 05:58

## 2022-05-11 RX ADMIN — SODIUM CHLORIDE 3 MILLILITER(S): 9 INJECTION INTRAMUSCULAR; INTRAVENOUS; SUBCUTANEOUS at 05:58

## 2022-05-11 RX ADMIN — Medication 975 MILLIGRAM(S): at 09:45

## 2022-05-11 RX ADMIN — Medication 600 MILLIGRAM(S): at 11:57

## 2022-05-11 RX ADMIN — Medication 600 MILLIGRAM(S): at 12:55

## 2022-05-11 RX ADMIN — Medication 975 MILLIGRAM(S): at 02:44

## 2022-05-11 NOTE — PROGRESS NOTE ADULT - SUBJECTIVE AND OBJECTIVE BOX
Post partum Day #1      Pt without complaints  vital signs stable      Abdomen soft  fundus firm, non tender  extremities non tender    lochia wnl      Patient doing well  Routine post partum care  Patient requesting dischare today
 04:00 pm    pt examined prior to epidural placement    SVE 3.5/50/-3   EFM: 130/moderate variability/+accels/- decels  [employer]: q3-4 mind    epidural called  plan for pitocin     d/w dr Palacios

## 2022-06-03 NOTE — OB PROVIDER IHI INDUCTION/AUGMENTATION NOTE - NS_OBIHIREPANPSATTEST_OBGYN_ALL_OB
I have discussed with the Attending the patient's status, as well as the H&P and the fetal status. The Attending agreed with the suggested management. Mustarde Flap Text: The defect edges were debeveled with a #15 scalpel blade.  Given the size, depth and location of the defect and the proximity to free margins a Mustarde flap was deemed most appropriate.  Using a sterile surgical marker, an appropriate flap was drawn incorporating the defect. The area thus outlined was incised with a #15 scalpel blade.  The skin margins were undermined to an appropriate distance in all directions utilizing iris scissors.

## 2022-10-16 ENCOUNTER — RESULT REVIEW (OUTPATIENT)
Age: 37
End: 2022-10-16

## 2022-12-01 NOTE — DISCHARGE NOTE OB - IF YOU ARE BREASTFEEDING, USE LANOLIN ON NIPPLES AS DIRECTED BY YOUR HEALTHCARE PROVIDER
PAST MEDICAL HISTORY:  Cervical myelopathy with cervical radiculopathy     Cervical stenosis of spine     Empyema s/p decortication thrombocytosis    HTN (hypertension)     Pneumonia       
Statement Selected

## 2022-12-23 NOTE — OB NEONATOLOGY/PEDIATRICIAN DELIVERY SUMMARY - NSVAGDELIVERYA_OBGYN_ALL_OB
History and Physical Update    Pt Name: Alexa Nayak  MRN: [id number]  YOB: 1960  Date of evaluation: 12/23/2022    [x] I have examined the patient and reviewed the H&P/Consult and there are no changes to the patient or plans.     [] I have examined the patient and reviewed the H&P/Consult and have noted the following changes:        Shabnam Mahoney MD   Electronically signed 12/23/2022 at 9:33 AM Spontaneous

## 2023-06-01 NOTE — DISCHARGE NOTE OB - PHYSICIAN SECTION COMPLETE
Patient calls stating that she is taking an over the counter weight loss pill called Ashish, she is wondering if this will affect her psychiatric medications.     Please advise.   Yes

## 2023-09-21 DIAGNOSIS — Z33.2 ENCOUNTER FOR ELECTIVE TERMINATION OF PREGNANCY: ICD-10-CM

## 2023-09-22 LAB
BASOPHILS # BLD AUTO: 0.03 K/UL
BASOPHILS NFR BLD AUTO: 0.3 %
EOSINOPHIL # BLD AUTO: 0.01 K/UL
EOSINOPHIL NFR BLD AUTO: 0.1 %
HCT VFR BLD CALC: 39 %
HGB BLD-MCNC: 12.7 G/DL
IMM GRANULOCYTES NFR BLD AUTO: 0.1 %
LYMPHOCYTES # BLD AUTO: 1.78 K/UL
LYMPHOCYTES NFR BLD AUTO: 18.1 %
MAN DIFF?: NORMAL
MCHC RBC-ENTMCNC: 28.9 PG
MCHC RBC-ENTMCNC: 32.6 GM/DL
MCV RBC AUTO: 88.6 FL
MONOCYTES # BLD AUTO: 0.43 K/UL
MONOCYTES NFR BLD AUTO: 4.4 %
NEUTROPHILS # BLD AUTO: 7.57 K/UL
NEUTROPHILS NFR BLD AUTO: 77 %
PLATELET # BLD AUTO: 238 K/UL
RBC # BLD: 4.4 M/UL
RBC # FLD: 12.3 %
WBC # FLD AUTO: 9.83 K/UL

## 2023-09-23 LAB
ABO + RH PNL BLD: NORMAL
BLD GP AB SCN SERPL QL: NORMAL

## 2023-09-27 ENCOUNTER — APPOINTMENT (OUTPATIENT)
Dept: OBGYN | Facility: CLINIC | Age: 38
End: 2023-09-27
Payer: COMMERCIAL

## 2023-09-27 VITALS
HEIGHT: 66 IN | DIASTOLIC BLOOD PRESSURE: 86 MMHG | WEIGHT: 156.25 LBS | SYSTOLIC BLOOD PRESSURE: 134 MMHG | BODY MASS INDEX: 25.11 KG/M2

## 2023-09-27 DIAGNOSIS — O02.1 MISSED ABORTION: ICD-10-CM

## 2023-09-27 PROCEDURE — 99213 OFFICE O/P EST LOW 20 MIN: CPT

## 2023-09-27 PROCEDURE — 76830 TRANSVAGINAL US NON-OB: CPT

## 2023-09-28 LAB
C TRACH RRNA SPEC QL NAA+PROBE: NOT DETECTED
N GONORRHOEA RRNA SPEC QL NAA+PROBE: NOT DETECTED
SOURCE AMPLIFICATION: NORMAL

## 2023-09-30 ENCOUNTER — APPOINTMENT (OUTPATIENT)
Dept: OBGYN | Facility: CLINIC | Age: 38
End: 2023-09-30
Payer: COMMERCIAL

## 2023-09-30 ENCOUNTER — RESULT REVIEW (OUTPATIENT)
Age: 38
End: 2023-09-30

## 2023-09-30 VITALS
WEIGHT: 156 LBS | BODY MASS INDEX: 25.07 KG/M2 | HEIGHT: 66 IN | DIASTOLIC BLOOD PRESSURE: 86 MMHG | SYSTOLIC BLOOD PRESSURE: 120 MMHG

## 2023-09-30 VITALS — DIASTOLIC BLOOD PRESSURE: 70 MMHG | SYSTOLIC BLOOD PRESSURE: 110 MMHG | HEART RATE: 80 BPM

## 2023-09-30 VITALS — SYSTOLIC BLOOD PRESSURE: 130 MMHG | DIASTOLIC BLOOD PRESSURE: 78 MMHG | HEART RATE: 105 BPM

## 2023-09-30 DIAGNOSIS — Z11.3 ENCOUNTER FOR SCREENING FOR INFECTIONS WITH A PREDOMINANTLY SEXUAL MODE OF TRANSMISSION: ICD-10-CM

## 2023-09-30 DIAGNOSIS — O09.521 SUPERVISION OF ELDERLY MULTIGRAVIDA, FIRST TRIMESTER: ICD-10-CM

## 2023-09-30 PROCEDURE — 76998 US GUIDE INTRAOP: CPT | Mod: 59

## 2023-09-30 PROCEDURE — 99214 OFFICE O/P EST MOD 30 MIN: CPT | Mod: 25

## 2023-09-30 PROCEDURE — 76815 OB US LIMITED FETUS(S): CPT

## 2023-09-30 PROCEDURE — 59820 CARE OF MISCARRIAGE: CPT

## 2023-10-02 ENCOUNTER — LABORATORY RESULT (OUTPATIENT)
Age: 38
End: 2023-10-02

## 2023-10-11 ENCOUNTER — NON-APPOINTMENT (OUTPATIENT)
Age: 38
End: 2023-10-11

## 2023-10-13 ENCOUNTER — APPOINTMENT (OUTPATIENT)
Dept: OBGYN | Facility: CLINIC | Age: 38
End: 2023-10-13
Payer: COMMERCIAL

## 2023-10-13 DIAGNOSIS — Z09 ENCOUNTER FOR FOLLOW-UP EXAMINATION AFTER COMPLETED TREATMENT FOR CONDITIONS OTHER THAN MALIGNANT NEOPLASM: ICD-10-CM

## 2023-10-13 PROCEDURE — 99213 OFFICE O/P EST LOW 20 MIN: CPT | Mod: 95

## 2024-09-11 ENCOUNTER — ASOB RESULT (OUTPATIENT)
Age: 39
End: 2024-09-11

## 2024-09-11 ENCOUNTER — APPOINTMENT (OUTPATIENT)
Dept: OBGYN | Facility: CLINIC | Age: 39
End: 2024-09-11
Payer: COMMERCIAL

## 2024-09-11 PROCEDURE — 76830 TRANSVAGINAL US NON-OB: CPT

## 2024-09-11 PROCEDURE — 76857 US EXAM PELVIC LIMITED: CPT | Mod: 59
